# Patient Record
(demographics unavailable — no encounter records)

---

## 2025-02-06 NOTE — HISTORY OF PRESENT ILLNESS
[de-identified] : 02/02/2025 EDWIN SANTOS is a 33year old female here today for: Location: right shoulder Complaint: The patient presents to the office today for evaluation of right shoulder pain.  She notes occasional dull achy pain in the anterior shoulder associated with overhead activities and heavy lifting.  No injuries that she can recall.  She denies neck pain, radiating pain, numbness and tingling.  Symptoms onset: January 2025 Past Treatment: None Dominant Hand: Right Occupation: [employer] PMH: Denies Allergies: NKDA

## 2025-02-06 NOTE — DISCUSSION/SUMMARY
[de-identified] : - discussed the etiology/ pathophysiology of the patient's complaints today in layman's terms - reviewed treatment options, conservative and operative as well as the indications for each - discussed conservative treatment options including the role for anti-inflammatory medications, injections and therapy - reviewed risks, benefits and alternatives to these - with all of this in mind the patient would like to continue with conservative treatment for now which I agree is reasonable - activity modifications reviewed, could consider PT, patient declines today - NSAIDs prn pain, Mobic Rx sent today - f/u prn   My cumulative time spent on this patient's visit included: Preparation for the visit, review of the medical records, review of pertinent diagnostic studies, examination and counseling of the patient on the above diagnosis, treatment plan and prognosis, orders of diagnostic tests, medications and/or appropriate procedures and documentation in the medical records of today's visit.

## 2025-02-06 NOTE — IMAGING
[de-identified] : Full neck range of motion  Shoulder (R / L) Normal muscle tone/ bulk TTP at bicipital groove  / 170 Abd 80 / 80 ER at side 60 / 60 IR T10 / T10 SILT throughout  Right shoulder 5/5 abduction 5/5 flexion in the plane of the scapula  5/5 external rotation 5/5 internal rotation  + Speed's Test + Yergason's  2 view right shoulder and 2 view right scapula: No acute fractures or malalignment, type II acromion